# Patient Record
Sex: FEMALE | Race: WHITE | NOT HISPANIC OR LATINO | Employment: FULL TIME | URBAN - NONMETROPOLITAN AREA
[De-identification: names, ages, dates, MRNs, and addresses within clinical notes are randomized per-mention and may not be internally consistent; named-entity substitution may affect disease eponyms.]

---

## 2024-06-12 ENCOUNTER — OFFICE VISIT (OUTPATIENT)
Dept: CARDIOLOGY | Facility: CLINIC | Age: 24
End: 2024-06-12
Payer: COMMERCIAL

## 2024-06-12 VITALS
HEART RATE: 96 BPM | WEIGHT: 157.4 LBS | SYSTOLIC BLOOD PRESSURE: 128 MMHG | DIASTOLIC BLOOD PRESSURE: 78 MMHG | HEIGHT: 63 IN | BODY MASS INDEX: 27.89 KG/M2

## 2024-06-12 DIAGNOSIS — R07.89 CHEST TIGHTNESS: ICD-10-CM

## 2024-06-12 DIAGNOSIS — R00.2 PALPITATIONS: Primary | ICD-10-CM

## 2024-06-12 DIAGNOSIS — R06.02 SHORTNESS OF BREATH: ICD-10-CM

## 2024-06-12 DIAGNOSIS — R00.0 TACHYCARDIA: ICD-10-CM

## 2024-06-12 DIAGNOSIS — R55 SYNCOPE AND COLLAPSE: ICD-10-CM

## 2024-06-12 RX ORDER — DESOGESTREL AND ETHINYL ESTRADIOL 21-5 (28)
1 KIT ORAL DAILY
COMMUNITY
Start: 2023-04-05

## 2024-06-12 RX ORDER — BISOPROLOL FUMARATE 5 MG/1
5 TABLET, FILM COATED ORAL DAILY
Qty: 90 TABLET | Refills: 3 | Status: SHIPPED | OUTPATIENT
Start: 2024-06-12

## 2024-06-12 NOTE — PROGRESS NOTES
Chief Complaint   Patient presents with   • Establish Care     Pt is here to establish care.  She states she has had palpitations and has passed out a few times.  Last time she passed out was about one year ago.  She has been watching her HR on her apple watch.  Her HR has been between .  She does have some  CP that she describes as squeezing - she states this started after she had COVID in 2020.  She also has SOB, randomly, can be at rest.  She states she only drinks one cup of coffee per day.   • Cardiac History     Pt denies ever being evaluated by cardiology.  She denies ever completing any testing.   • Lab Work     Last labs 5/17/24- in chart.        CARDIAC COMPLAINTS  chest pressure/discomfort, dyspnea, and palpitations      Subjective   Kia Leon is a 24 y.o. female came in today for initial cardiac evaluation.  She has no previously diagnosed cardiac history.  She started noticing palpitation, chest pain and shortness of breath.  This has been going on for the last few years.  She had COVID infection in 2020.  Since then she noticed palpitation in the form of heart racing.  Her Apple Watch showed her heart rate varies between 40 to 140 bpm.  She noticed chest pain in the form excusing pain, if the heart rate remained high for a prolonged period of time.  She also started noticing shortness of breath which occurs both during tachycardia and sometimes even at rest.  She does have episodes of dizziness and had 1 episode of syncope.  This happened when she was in the shower and apparently felt dizzy and the next thing she knows she was on the floor.  It lasted only for few seconds.  She did not seek any medical attention.  She was seen at your office found to be tachycardic.  Her lab work showed normal thyroid function test.  Normal BUN/creatinine and normal liver function test.  Her hemoglobin hematocrit are normal.  She has no history of smoking or drinking alcohol there is no family history of  premature coronary artery disease.  No history of any congenital heart disease.    Past Surgical History:   Procedure Laterality Date   • MOUTH SURGERY      wisdom teeth       Current Outpatient Medications   Medication Sig Dispense Refill   • Volnea 0.15-0.02/0.01 MG (21/5) per tablet Take 1 tablet by mouth Daily.     • bisoprolol (ZEBeta) 5 MG tablet Take 1 tablet by mouth Daily. 90 tablet 3     No current facility-administered medications for this visit.           ALLERGIES:  Patient has no known allergies.    History reviewed. No pertinent past medical history.    Social History     Tobacco Use   Smoking Status Never   Smokeless Tobacco Never          Family History   Problem Relation Age of Onset   • No Known Problems Mother    • Fainting Father    • Asthma Sister    • Heart disease Maternal Grandmother         hx of CABG x2   • Heart attack Maternal Grandfather    • Cancer Maternal Grandfather    • No Known Problems Paternal Grandmother    • Throat cancer Paternal Grandfather    • Diabetes Paternal Grandfather        Review of Systems   Constitutional: Negative for decreased appetite and malaise/fatigue.   HENT:  Negative for congestion and sore throat.    Eyes:  Negative for blurred vision, double vision and visual disturbance.   Cardiovascular:  Positive for chest pain, dyspnea on exertion, palpitations and syncope.   Respiratory:  Positive for shortness of breath. Negative for snoring.    Endocrine: Negative for cold intolerance and heat intolerance.   Hematologic/Lymphatic: Negative for adenopathy. Does not bruise/bleed easily.   Skin:  Negative for itching, nail changes and skin cancer.   Musculoskeletal:  Negative for arthritis and myalgias.   Gastrointestinal:  Negative for abdominal pain, dysphagia and heartburn.   Genitourinary:  Negative for bladder incontinence and frequency.   Neurological:  Negative for dizziness, seizures and vertigo.   Psychiatric/Behavioral:  Negative for altered mental status.  "   Allergic/Immunologic: Negative for environmental allergies and hives.     Diabetes- No  Thyroid- normal    Objective     /78 (BP Location: Right arm, Patient Position: Sitting)   Pulse 96   Ht 160 cm (63\")   Wt 71.4 kg (157 lb 6.4 oz)   BMI 27.88 kg/m²     Vitals and nursing note reviewed.   Constitutional:       Appearance: Healthy appearance. Not in distress.   Eyes:      Conjunctiva/sclera: Conjunctivae normal.      Pupils: Pupils are equal, round, and reactive to light.   HENT:      Head: Normocephalic.   Pulmonary:      Effort: Pulmonary effort is normal.      Breath sounds: Normal breath sounds.   Cardiovascular:      PMI at left midclavicular line. Normal rate. Regular rhythm.      Murmurs: There is a grade 2/6 high frequency blowing holosystolic murmur at the apex.   Abdominal:      General: Bowel sounds are normal.      Palpations: Abdomen is soft.   Musculoskeletal: Normal range of motion.      Cervical back: Normal range of motion and neck supple. Skin:     General: Skin is warm and dry.   Neurological:      Mental Status: Alert, oriented to person, place, and time and oriented to person, place and time.       ECG 12 Lead    Date/Time: 6/12/2024 1:05 PM  Performed by: Nicolette Dahl MD    Authorized by: Nicolette Dahl MD  Comparison: compared with previous ECG from 5/17/2024  Comparison to previous ECG: Rate is better  Rhythm: sinus rhythm and sinus arrhythmia  Rate: normal  QRS axis: normal    Clinical impression: normal ECG        @ASSESSMENT/PLAN@  BMI is >= 25 and <30. (Overweight) The following options were offered after discussion;: none (medical contraindication)     Diagnoses and all orders for this visit:    1. Palpitations (Primary)  -     bisoprolol (ZEBeta) 5 MG tablet; Take 1 tablet by mouth Daily.  Dispense: 90 tablet; Refill: 3  -     Treadmill Stress Test; Future  -     Holter Monitor - 72 Hour Up To 15 Days; Future    2. Syncope and collapse  -     Adult " Transthoracic Echo Complete W/ Cont if Necessary Per Protocol; Future  -     Holter Monitor - 72 Hour Up To 15 Days; Future    3. Chest tightness  -     Treadmill Stress Test; Future    4. Shortness of breath  -     Adult Transthoracic Echo Complete W/ Cont if Necessary Per Protocol; Future    5. Tachycardia  -     bisoprolol (ZEBeta) 5 MG tablet; Take 1 tablet by mouth Daily.  Dispense: 90 tablet; Refill: 3  -     Holter Monitor - 72 Hour Up To 15 Days; Future       At baseline her heart rate is upper limit of normal.  Her blood pressure is normal.  EKG done today shows normal sinus rhythm with sinus arrhythmia.  Normal [place] interval and normal QTc.  Her clinical examination reveals a BMI of 28.  Her cardiovascular examination reveals short systolic murmur at the mitral area.    Regarding the palpitation, it could be related to sinus tachycardia but need to rule out other arrhythmias including PSVT.  I advised her to wear a monitor for the next 2 weeks to see what kind of arrhythmia she has.  I started her on bisoprolol at 5 mg once a day.    Regarding syncopal episode, it appears to be most likely secondary to postural hypotension.  She almost passed out when she had her blood drawn at your office.  I talked to her about increasing the fluid intake.  Sometimes the beta-blockers may be helpful for the increased vasovagal tone    Regarding the chest tightness, it could be from her lungs but need to rule out cardiac.  Will get an stress test to evaluate her functional status, chronotropic response, blood pressure response and rule out any stress-induced ischemia or arrhythmia.  If she started noticing chest tightness without any EKG changes then we will try Singulair and bronchodilators    Regarding the shortness of breath, it could be from anxiety but also need to rule out primary cardiac.  Will get an echocardiogram to evaluate the LV function, valvular structures and the PA pressure    Regarding the tachycardia, I  talked to her about possibility of inappropriate sinus tachycardia.  Hopefully the low-dose of beta-blockers reduces the frequency of the arrhythmia.             Electronically signed by Nicolette Dahl MD June 12, 2024 12:58 EDT

## 2024-06-12 NOTE — LETTER
June 12, 2024       No Recipients    Patient: Kia Leon   YOB: 2000   Date of Visit: 6/12/2024     Dear Donell Rothman Jr., MD:       Thank you for referring Kia Leon to me for evaluation. Below are the relevant portions of my assessment and plan of care.    If you have questions, please do not hesitate to call me. I look forward to following Kia along with you.         Sincerely,        Nicolette Dahl MD        CC:   No Recipients    Nicolette Dahl MD  06/12/24 1306  Sign when Signing Visit  Chief Complaint   Patient presents with   • Establish Care     Pt is here to establish care.  She states she has had palpitations and has passed out a few times.  Last time she passed out was about one year ago.  She has been watching her HR on her apple watch.  Her HR has been between .  She does have some  CP that she describes as squeezing - she states this started after she had COVID in 2020.  She also has SOB, randomly, can be at rest.  She states she only drinks one cup of coffee per day.   • Cardiac History     Pt denies ever being evaluated by cardiology.  She denies ever completing any testing.   • Lab Work     Last labs 5/17/24- in chart.        CARDIAC COMPLAINTS  chest pressure/discomfort, dyspnea, and palpitations      Subjective  Kia Leon is a 24 y.o. female came in today for initial cardiac evaluation.  She has no previously diagnosed cardiac history.  She started noticing palpitation, chest pain and shortness of breath.  This has been going on for the last few years.  She had COVID infection in 2020.  Since then she noticed palpitation in the form of heart racing.  Her Apple Watch showed her heart rate varies between 40 to 140 bpm.  She noticed chest pain in the form excusing pain, if the heart rate remained high for a prolonged period of time.  She also started noticing shortness of breath which occurs both during tachycardia and sometimes even at rest.  She does  have episodes of dizziness and had 1 episode of syncope.  This happened when she was in the shower and apparently felt dizzy and the next thing she knows she was on the floor.  It lasted only for few seconds.  She did not seek any medical attention.  She was seen at your office found to be tachycardic.  Her lab work showed normal thyroid function test.  Normal BUN/creatinine and normal liver function test.  Her hemoglobin hematocrit are normal.  She has no history of smoking or drinking alcohol there is no family history of premature coronary artery disease.  No history of any congenital heart disease.    Past Surgical History:   Procedure Laterality Date   • MOUTH SURGERY      wisdom teeth       Current Outpatient Medications   Medication Sig Dispense Refill   • Volnea 0.15-0.02/0.01 MG (21/5) per tablet Take 1 tablet by mouth Daily.     • bisoprolol (ZEBeta) 5 MG tablet Take 1 tablet by mouth Daily. 90 tablet 3     No current facility-administered medications for this visit.           ALLERGIES:  Patient has no known allergies.    History reviewed. No pertinent past medical history.    Social History     Tobacco Use   Smoking Status Never   Smokeless Tobacco Never          Family History   Problem Relation Age of Onset   • No Known Problems Mother    • Fainting Father    • Asthma Sister    • Heart disease Maternal Grandmother         hx of CABG x2   • Heart attack Maternal Grandfather    • Cancer Maternal Grandfather    • No Known Problems Paternal Grandmother    • Throat cancer Paternal Grandfather    • Diabetes Paternal Grandfather        Review of Systems   Constitutional: Negative for decreased appetite and malaise/fatigue.   HENT:  Negative for congestion and sore throat.    Eyes:  Negative for blurred vision, double vision and visual disturbance.   Cardiovascular:  Positive for chest pain, dyspnea on exertion, palpitations and syncope.   Respiratory:  Positive for shortness of breath. Negative for snoring.   "  Endocrine: Negative for cold intolerance and heat intolerance.   Hematologic/Lymphatic: Negative for adenopathy. Does not bruise/bleed easily.   Skin:  Negative for itching, nail changes and skin cancer.   Musculoskeletal:  Negative for arthritis and myalgias.   Gastrointestinal:  Negative for abdominal pain, dysphagia and heartburn.   Genitourinary:  Negative for bladder incontinence and frequency.   Neurological:  Negative for dizziness, seizures and vertigo.   Psychiatric/Behavioral:  Negative for altered mental status.    Allergic/Immunologic: Negative for environmental allergies and hives.     Diabetes- No  Thyroid- normal    Objective    /78 (BP Location: Right arm, Patient Position: Sitting)   Pulse 96   Ht 160 cm (63\")   Wt 71.4 kg (157 lb 6.4 oz)   BMI 27.88 kg/m²     Vitals and nursing note reviewed.   Constitutional:       Appearance: Healthy appearance. Not in distress.   Eyes:      Conjunctiva/sclera: Conjunctivae normal.      Pupils: Pupils are equal, round, and reactive to light.   HENT:      Head: Normocephalic.   Pulmonary:      Effort: Pulmonary effort is normal.      Breath sounds: Normal breath sounds.   Cardiovascular:      PMI at left midclavicular line. Normal rate. Regular rhythm.      Murmurs: There is a grade 2/6 high frequency blowing holosystolic murmur at the apex.   Abdominal:      General: Bowel sounds are normal.      Palpations: Abdomen is soft.   Musculoskeletal: Normal range of motion.      Cervical back: Normal range of motion and neck supple. Skin:     General: Skin is warm and dry.   Neurological:      Mental Status: Alert, oriented to person, place, and time and oriented to person, place and time.       ECG 12 Lead    Date/Time: 6/12/2024 1:05 PM  Performed by: Nicolette Dahl MD    Authorized by: Nicolette Dahl MD  Comparison: compared with previous ECG from 5/17/2024  Comparison to previous ECG: Rate is better  Rhythm: sinus rhythm and sinus " arrhythmia  Rate: normal  QRS axis: normal    Clinical impression: normal ECG        @ASSESSMENT/PLAN@  BMI is >= 25 and <30. (Overweight) The following options were offered after discussion;: none (medical contraindication)     Diagnoses and all orders for this visit:    1. Palpitations (Primary)  -     bisoprolol (ZEBeta) 5 MG tablet; Take 1 tablet by mouth Daily.  Dispense: 90 tablet; Refill: 3  -     Treadmill Stress Test; Future  -     Holter Monitor - 72 Hour Up To 15 Days; Future    2. Syncope and collapse  -     Adult Transthoracic Echo Complete W/ Cont if Necessary Per Protocol; Future  -     Holter Monitor - 72 Hour Up To 15 Days; Future    3. Chest tightness  -     Treadmill Stress Test; Future    4. Shortness of breath  -     Adult Transthoracic Echo Complete W/ Cont if Necessary Per Protocol; Future    5. Tachycardia  -     bisoprolol (ZEBeta) 5 MG tablet; Take 1 tablet by mouth Daily.  Dispense: 90 tablet; Refill: 3  -     Holter Monitor - 72 Hour Up To 15 Days; Future       At baseline her heart rate is upper limit of normal.  Her blood pressure is normal.  EKG done today shows normal sinus rhythm with sinus arrhythmia.  Normal [place] interval and normal QTc.  Her clinical examination reveals a BMI of 28.  Her cardiovascular examination reveals short systolic murmur at the mitral area.    Regarding the palpitation, it could be related to sinus tachycardia but need to rule out other arrhythmias including PSVT.  I advised her to wear a monitor for the next 2 weeks to see what kind of arrhythmia she has.  I started her on bisoprolol at 5 mg once a day.    Regarding syncopal episode, it appears to be most likely secondary to postural hypotension.  She almost passed out when she had her blood drawn at your office.  I talked to her about increasing the fluid intake.  Sometimes the beta-blockers may be helpful for the increased vasovagal tone    Regarding the chest tightness, it could be from her lungs but need  to rule out cardiac.  Will get an stress test to evaluate her functional status, chronotropic response, blood pressure response and rule out any stress-induced ischemia or arrhythmia.  If she started noticing chest tightness without any EKG changes then we will try Singulair and bronchodilators    Regarding the shortness of breath, it could be from anxiety but also need to rule out primary cardiac.  Will get an echocardiogram to evaluate the LV function, valvular structures and the PA pressure    Regarding the tachycardia, I talked to her about possibility of inappropriate sinus tachycardia.  Hopefully the low-dose of beta-blockers reduces the frequency of the arrhythmia.             Electronically signed by Nicolette Dahl MD June 12, 2024 12:58 EDT

## 2024-08-05 ENCOUNTER — TELEPHONE (OUTPATIENT)
Dept: CARDIOLOGY | Facility: CLINIC | Age: 24
End: 2024-08-05
Payer: COMMERCIAL

## 2024-08-05 NOTE — TELEPHONE ENCOUNTER
----- Message from Nicolette Dahl sent at 8/5/2024  6:04 AM EDT -----  Regarding: FW: Life insurance   Contact: [phone]        ----- Message -----  From: Angelica Webster RN  Sent: 8/2/2024  10:32 AM EDT  To: Nicolette Dahl MD  Subject: FW: Life insurance                                 ----- Message -----  From: Kia Leon  Sent: 7/31/2024   9:33 AM EDT  To: Jaswinder Butts [facility]  Subject: Life insurance                                   Can i get a letter stating the echo & stress test are no longer necessary after the results from the heart monitor for life insurance purposes?

## 2024-08-05 NOTE — TELEPHONE ENCOUNTER
I spoke with patient's mom Sandy, advised of Dr Dahl's recommendations. She said she will advise pt and have her talk with the insurance company to see if a letter would even help. She will have the patient call us back with their recommendations if she is still interested in proceeding with that particular company.

## 2024-08-19 ENCOUNTER — TELEPHONE (OUTPATIENT)
Dept: CARDIOLOGY | Facility: CLINIC | Age: 24
End: 2024-08-19
Payer: COMMERCIAL

## 2024-08-19 NOTE — LETTER
TO: Francesco Fang, [employer]     DATE: 2024    This patient Kia Leon  : 2000      To whom it may concern,      From a cardiac standpoint, since patient is asymptomatic with current therapy, no additional tests are     necessary unless she becomes symptomatic.            Electronically signed by Nicolette Dahl MD    2024   09:00 EDT

## 2024-08-19 NOTE — TELEPHONE ENCOUNTER
Caller: Kia Leon    Relationship: Self    Best call back number:[phone]     What is the best time to reach you: ANYTIME. VM OKAY    Who are you requesting to speak with (clinical staff, provider,  specific staff member): JENNIFER      What was the call regarding: FOUND THE OFFICE FOR HER LIFE INSURANCE PAPERWORK.    --> [facility]  [facility] IN [place], [place]    Is it okay if the provider responds through MyChart: NO

## 2024-08-20 NOTE — TELEPHONE ENCOUNTER
"Spoke with pt's mom, she said pt is wanting a  letter faxed to Francesco Fang's insurance agency stating  \" since she is asymptomatic with current therapy, no additional tests are necessary unless she has more symptoms.\"  "

## 2024-12-11 ENCOUNTER — TELEPHONE (OUTPATIENT)
Dept: CARDIOLOGY | Facility: CLINIC | Age: 24
End: 2024-12-11

## 2024-12-11 NOTE — TELEPHONE ENCOUNTER
CALLED PATIENT AND NO ANSWER.  I LEFT A MESSAGE HUB TO SCHEDULE WITH NP IF SHE IS WILLING.  IF NOT PLEASE TRANSFER TO THE OFFICE AND LET HER KNOW IT WILL BE AROUND MAY OR JUNE POSSIBLY.

## 2024-12-11 NOTE — TELEPHONE ENCOUNTER
Caller: Kia Leon    Relationship to patient: Self    Best call back number: [phone]    Chief complaint: NONE    Type of visit: FOLLOW UP    Requested date: ANY     If rescheduling, when is the original appointment: 12.11.24     Additional notes:[facility] LOOKED OUT 2 YEARS AND DID NOT HAVE ANY APPOINTMENTS FOR DR. CABRALES.

## 2024-12-11 NOTE — TELEPHONE ENCOUNTER
Caller: Kia Leon    Relationship to patient: Self    Best call back number: [phone]    Chief complaint: NEEDS TO RS HER 6 MONTH FU    Type of visit: 6 MON    Requested date: FRIDAYS WORK [facility], OR WED AT 1030 AFTER THE NEW YEAR      If rescheduling, when is the original appointment: 12.11.24     Additional notes: PT IS OK WITH SEEING ANYONE

## 2025-04-23 ENCOUNTER — OFFICE VISIT (OUTPATIENT)
Dept: CARDIOLOGY | Facility: CLINIC | Age: 25
End: 2025-04-23
Payer: COMMERCIAL

## 2025-04-23 VITALS
SYSTOLIC BLOOD PRESSURE: 114 MMHG | DIASTOLIC BLOOD PRESSURE: 78 MMHG | HEART RATE: 60 BPM | WEIGHT: 164 LBS | HEIGHT: 63 IN | BODY MASS INDEX: 29.06 KG/M2

## 2025-04-23 DIAGNOSIS — R00.0 TACHYCARDIA: ICD-10-CM

## 2025-04-23 DIAGNOSIS — R00.2 PALPITATIONS: ICD-10-CM

## 2025-04-23 PROCEDURE — 99214 OFFICE O/P EST MOD 30 MIN: CPT | Performed by: NURSE PRACTITIONER

## 2025-04-23 RX ORDER — BISOPROLOL FUMARATE 5 MG/1
5 TABLET, FILM COATED ORAL DAILY
Qty: 90 TABLET | Refills: 3 | Status: SHIPPED | OUTPATIENT
Start: 2025-04-23

## 2025-04-23 NOTE — PROGRESS NOTES
Chief Complaint   Patient presents with    Follow-up     Cardiac management    Lab     Last labs in chart.    Med Refill     Needs refills on Bisoprolol-90 day        HPI:  ROSALIE Leon is a 25 y.o. female with no previously diagnosed cardiac history.  She established care 6/2024.  She had palpitation, chest pain and shortness of breath that has been going on for a few years.  She had COVID infection in 2020.  Since then she noticed palpitation in the form of heart racing.  Her Apple Watch showed her heart rate varies between 40 to 140 bpm.  She noticed chest pain in the form excusing pain, if the heart rate remained high for a prolonged period of time.  She also started noticing shortness of breath which occurs both during tachycardia and sometimes even at rest.  She had episodes of dizziness and had 1 episode of syncope. She has no history of smoking or drinking alcohol there is no family history of premature coronary artery disease.  No history of any congenital heart disease.     She returns today for a follow-up visit. Patient denies chest pain, pressure, palpitations, tightness, dizziness, shortness of air. She Report random occasional high HR according to apple watch but much improved and she doesn't feel them in the moment.     Cardiac History:    Past Surgical History:   Procedure Laterality Date    CONVERTED (HISTORICAL) HOLTER  07/08/2024    13 days. Avg 67. . No PAC or PVC    MOUTH SURGERY      wisdom teeth       Current Outpatient Medications   Medication Sig Dispense Refill    bisoprolol (ZEBeta) 5 MG tablet Take 1 tablet by mouth Daily. 90 tablet 3    Volnea 0.15-0.02/0.01 MG (21/5) per tablet Take 1 tablet by mouth Daily.       No current facility-administered medications for this visit.       Patient has no known allergies.    No past medical history on file.    Social History     Socioeconomic History    Marital status: Single   Tobacco Use    Smoking status: Never     Passive exposure:  "Never    Smokeless tobacco: Never   Vaping Use    Vaping status: Never Used   Substance and Sexual Activity    Alcohol use: Never    Drug use: Never    Sexual activity: Yes     Partners: Male     Birth control/protection: Condom, Birth control pill, Partner of same sex       Family History   Problem Relation Age of Onset    No Known Problems Mother     Fainting Father     Asthma Sister     Heart disease Maternal Grandmother         hx of CABG x2    Heart attack Maternal Grandfather     Cancer Maternal Grandfather     No Known Problems Paternal Grandmother     Throat cancer Paternal Grandfather     Diabetes Paternal Grandfather        Vitals:   /78 (BP Location: Right arm, Patient Position: Sitting)   Pulse 60   Ht 160 cm (62.99\")   Wt 74.4 kg (164 lb)   BMI 29.06 kg/m²     Physical Exam  Vitals and nursing note reviewed.   Neck:      Vascular: No carotid bruit.   Cardiovascular:      Rate and Rhythm: Normal rate and regular rhythm.      Pulses: Normal pulses.      Heart sounds: Normal heart sounds. No murmur heard.     No friction rub. No gallop.   Pulmonary:      Effort: Pulmonary effort is normal.      Breath sounds: Normal breath sounds and air entry.   Musculoskeletal:      Right lower leg: No edema.      Left lower leg: No edema.   Skin:     General: Skin is warm and dry.      Capillary Refill: Capillary refill takes less than 2 seconds.   Neurological:      Mental Status: She is alert and oriented to person, place, and time.       Procedures     Assessment & Plan     Diagnoses and all orders for this visit:    1. Palpitations  -     bisoprolol (ZEBeta) 5 MG tablet; Take 1 tablet by mouth Daily.  Dispense: 90 tablet; Refill: 3    2. Tachycardia  -     bisoprolol (ZEBeta) 5 MG tablet; Take 1 tablet by mouth Daily.  Dispense: 90 tablet; Refill: 3    Palpitations/tachycardia  - Controlled with bisoprolol, continue  - Heart rate normal today at 60 bpm  - Recommend if she feels palpitations she can take an " extra half a tablet of bisoprolol as needed    Stable from a cardiac standpoint. No further testing recommended at this time. No medication changes made today.  Refill bisoprolol    Visit Diagnoses:    ICD-10-CM ICD-9-CM   1. Palpitations  R00.2 785.1   2. Tachycardia  R00.0 785.0       Meds Ordered During Visit:  New Medications Ordered This Visit   Medications    bisoprolol (ZEBeta) 5 MG tablet     Sig: Take 1 tablet by mouth Daily.     Dispense:  90 tablet     Refill:  3       Follow Up Appointment:   Return in about 1 year (around 4/23/2026), or if symptoms worsen or fail to improve.           This document has been electronically signed by DU Barron  April 28, 2025 08:19 EDT    Dictated Utilizing Dragon Dictation: Part of this note may be an electronic transcription/translation of spoken language to printed text using the Dragon Dictation System.